# Patient Record
Sex: FEMALE | Race: OTHER | NOT HISPANIC OR LATINO | URBAN - METROPOLITAN AREA
[De-identification: names, ages, dates, MRNs, and addresses within clinical notes are randomized per-mention and may not be internally consistent; named-entity substitution may affect disease eponyms.]

---

## 2021-08-03 ENCOUNTER — OUTPATIENT (OUTPATIENT)
Dept: OUTPATIENT SERVICES | Facility: HOSPITAL | Age: 36
LOS: 1 days | Discharge: HOME | End: 2021-08-03
Payer: COMMERCIAL

## 2021-08-03 PROCEDURE — 90792 PSYCH DIAG EVAL W/MED SRVCS: CPT | Mod: 95

## 2021-08-11 PROCEDURE — 90834 PSYTX W PT 45 MINUTES: CPT | Mod: 95

## 2021-08-19 DIAGNOSIS — F42.9 OBSESSIVE-COMPULSIVE DISORDER, UNSPECIFIED: ICD-10-CM

## 2021-08-24 PROCEDURE — 99214 OFFICE O/P EST MOD 30 MIN: CPT | Mod: 95

## 2022-01-04 PROCEDURE — 90853 GROUP PSYCHOTHERAPY: CPT | Mod: 95

## 2022-01-12 PROCEDURE — 90834 PSYTX W PT 45 MINUTES: CPT | Mod: 95

## 2022-03-08 PROCEDURE — 90853 GROUP PSYCHOTHERAPY: CPT | Mod: 95

## 2022-03-10 PROCEDURE — 90853 GROUP PSYCHOTHERAPY: CPT | Mod: 95

## 2022-07-13 PROCEDURE — 90853 GROUP PSYCHOTHERAPY: CPT | Mod: 95

## 2022-07-20 PROCEDURE — 90853 GROUP PSYCHOTHERAPY: CPT | Mod: 95

## 2022-07-27 PROCEDURE — 90853 GROUP PSYCHOTHERAPY: CPT | Mod: 95

## 2022-08-03 PROCEDURE — 90853 GROUP PSYCHOTHERAPY: CPT | Mod: 95

## 2022-08-10 PROCEDURE — 90853 GROUP PSYCHOTHERAPY: CPT | Mod: 95

## 2022-08-17 PROCEDURE — 90853 GROUP PSYCHOTHERAPY: CPT | Mod: 95

## 2022-08-24 PROCEDURE — 90853 GROUP PSYCHOTHERAPY: CPT | Mod: 95

## 2022-08-31 PROCEDURE — 90853 GROUP PSYCHOTHERAPY: CPT | Mod: 95

## 2022-09-16 ENCOUNTER — OUTPATIENT (OUTPATIENT)
Dept: OUTPATIENT SERVICES | Facility: HOSPITAL | Age: 37
LOS: 1 days | Discharge: HOME | End: 2022-09-16

## 2022-10-12 DIAGNOSIS — F42.9 OBSESSIVE-COMPULSIVE DISORDER, UNSPECIFIED: ICD-10-CM

## 2022-10-26 ENCOUNTER — EMERGENCY (EMERGENCY)
Facility: HOSPITAL | Age: 37
LOS: 1 days | Discharge: HOME | End: 2022-10-26
Attending: EMERGENCY MEDICINE | Admitting: EMERGENCY MEDICINE

## 2022-10-26 VITALS
HEART RATE: 90 BPM | TEMPERATURE: 98 F | DIASTOLIC BLOOD PRESSURE: 67 MMHG | OXYGEN SATURATION: 100 % | RESPIRATION RATE: 24 BRPM | SYSTOLIC BLOOD PRESSURE: 118 MMHG

## 2022-10-26 DIAGNOSIS — F42.9 OBSESSIVE-COMPULSIVE DISORDER, UNSPECIFIED: ICD-10-CM

## 2022-10-26 PROCEDURE — 90792 PSYCH DIAG EVAL W/MED SRVCS: CPT | Mod: GC

## 2022-10-26 PROCEDURE — 99284 EMERGENCY DEPT VISIT MOD MDM: CPT

## 2022-10-26 RX ADMIN — Medication 2 MILLIGRAM(S): at 09:21

## 2022-10-26 NOTE — ED BEHAVIORAL HEALTH ASSESSMENT NOTE - SUMMARY
Ms. Hawthorne is a 38yo F, domiciled with  and three children, unemployed, PPH of OCD and anxiety with history of previous IP admission a few years ago at [facility] for intrusive thoughts of wanting to hurt her children, no history of harming others, one previous suicide attempt via OD, following up at [facility], no significant PMH, presenting to ED for intrusive fears that she is going to harm her child. Pt presenting with an exacerbation of her OCD symptoms, triggered by hearing about another person with OCD hurting their children. Her presentation is similar to her presentation as an outpatient, with intrusive fears of hurting her children although never having done so in the past. Currently pt has no thoughts of harming her children with no history of doing so in the past, with outpatient psychiatrist in agreement, not posing as an imminent threat to children and not requiring inpatient psychiatric admission.     Plan:  -discharge home   -spoke with outpatient pschiatrist about starting benzo with our recommendation as Klonopin 0.5mg BID PRN and Atarax for breakthrough anxiety during exacerbation. Ms. Hawthorne is a 36yo F, domiciled with  and three children, unemployed, PPH of OCD and anxiety with history of previous IP admission a few years ago at [facility] for intrusive thoughts of wanting to hurt her children, no history of harming others, one previous suicide attempt via OD, following up at [facility], no significant PMH, presenting to ED for intrusive fears that she is going to harm her child.   Pt presenting with an exacerbation of her OCD symptoms, triggered by hearing about another person with OCD hurting their children. Her presentation is similar to her presentation as an outpatient, with intrusive fears of hurting her children although never having done so in the past. Currently pt has no thoughts of harming her children with no history of doing so in the past, with outpatient psychiatrist in agreement, not posing as an imminent threat to children and not requiring inpatient psychiatric admission.     Plan:  -discharge home   -spoke with outpatient psychiatrist about starting benzo with our recommendation as Klonopin 0.5mg BID PRN and Atarax for breakthrough anxiety during exacerbation.

## 2022-10-26 NOTE — ED BEHAVIORAL HEALTH ASSESSMENT NOTE - DESCRIPTION
tearful and anxious, received Ativan 2mg for anxiety. 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 Overall in good behavioral control    Vital Stable

## 2022-10-26 NOTE — ED BEHAVIORAL HEALTH NOTE - BEHAVIORAL HEALTH NOTE
COLLATERAL INFO OBTAINED FROM THE SPOUSE, PETRA MARIE [phone]: who reported that the pt has hx of OCD and had prior psych admissions; with past hx of OD on pills.  currently, on follow up at [facility]. has been compliant with her prescribed meds. lately for the past 1 week, the Pt was noted to be withdrawn - not divulging on details of what is going on with her; she is not motivated to go out.     reported that there are triggering events on the Pt: in particular, a relationship conflict with the mother; over the weekend, "something happened" between Pt and mother as Pt visited the mother (in [place]). Pt would not divulge details re: what transpired between her and the mother. another potential trigger is an underlying evolving marital discord.  resents that the Pt is not forthcoming re; how she is feeling, nor what has been happening between Pt and her mother.  reported that Pt's mother "does not like him".      denied any signs/ symptoms of psychosis; not manic. early this AM, the pt woke him up. she told him that she will be going to the ED as she has been increasingly anxious; having bothersome thoughts about hurting their son.  does not think that Pt will resort to hurting their son. however, did express that he feels uncomfortable leaving their son with the Pt whilst he goes to work (as an ).   denied Pt actively verbalizing any HI. no HI towards family members or any other people in the community. no passive or active SI.

## 2022-10-26 NOTE — ED PROVIDER NOTE - CLINICAL SUMMARY MEDICAL DECISION MAKING FREE TEXT BOX
38 yo F with history of post partum depression and was hospitalized 14 years ago for this that presents with anxiety and concern for HI towards her 1 year old son. Pt is crying in ED. No abnormal physical exam findings. Will consult Psych for recs for post partum depression with concern for hurting her son.

## 2022-10-26 NOTE — ED PROVIDER NOTE - OBJECTIVE STATEMENT
38 yo F with history of post partum depression and was hospitalized 14 years ago for this that presents with anxiety and concern for HI towards her 1 year old son. Per pt, she is a patient of Dr Caro at [facility] for anxiety, on multiple medications but does not recall which one she was Rx'd for anxiety. Last two weeks has been 38 yo F with history of post partum depression and was hospitalized 14 years ago for this that presents with anxiety and concern for HI towards her 1 year old son. Per pt, she is a patient of Dr Caro at [facility] for anxiety, on multiple medications but does not recall which one she was Rx'd for anxiety. Last two weeks has been having worsening anxiety and tonight was on facebook, saw another mother post something about OCD and hurting their child which set pt off and she took her meds but not helping so came to ED for eval. Denies any physical pain or symptoms. No SI/hallucinations otherwise, No smoking, drinking, drugs.

## 2022-10-26 NOTE — ED PROVIDER NOTE - NSFOLLOWUPINSTRUCTIONS_ED_ALL_ED_FT
[facility] outpatient psychiatry to arrange a follow up appointment.    Worsening symptoms please return back to the emergency department.    Continue your prescribed medications.

## 2022-10-26 NOTE — ED BEHAVIORAL HEALTH ASSESSMENT NOTE - RISK ASSESSMENT
Risk factors: h/o psych admissions, one previous SA, intrusive thoughts of harming child    Protective factors: no current SIIP/HIIP, no access to weapons, no active substance abuse, good physical health, no psychosis, dependent children, domiciled, intact marriage, social supports, positive therapeutic relationship, engaged in treatment, compliant with treatment, help-seeking behaviors    Overall, pt is a low risk of harm to self/others and does not meet criteria for psychiatric admission. Low Acute Suicide Risk

## 2022-10-26 NOTE — ED PROVIDER NOTE - PROGRESS NOTE DETAILS
LILIAM: Psych are in process of evaluating pt. They recommend Ativan 2 mg at this time PO. Will order and continue to monitor pt pending final psych recs. Abdias: D/w janelle. They will reassess and make their recommendations. As per janelle patient has had similar ruminations in the past several years with no plans and stated that the patient will be safe to d/c however they are reevaluating and will make their final recommendation., Abdias: Psych to d/c patient. OP [facility] clinic to follow up with appointment including a prescription for a benzo.

## 2022-10-26 NOTE — ED BEHAVIORAL HEALTH ASSESSMENT NOTE - NSBHATTESTCOMMENTATTENDFT_PSY_A_CORE
Ms. Hawthorne is a 38yo F, domiciled with  and three children, unemployed, PPH of OCD and anxiety with history of previous IP admission a few years ago at [facility] for intrusive thoughts of wanting to hurt her children, no history of harming others, one previous suicide attempt via OD, following up at [facility], no significant PMH, presenting to ED for intrusive fears that she is going to harm her child.  Patient self presented with worsening intrusive thoughts of fears she's going to harm her children as well as fear of developing psychosis. Patient in her lifetime, has never harmed her child or exhibited symptoms of psychosis. She has been calm and cooperative in the ED and after a dose of Ativan 2mg po, she reports her anxiety to be improved. Patient is able to effectively safety plan,  has no acute safety concerns and outpatient psychiatrist reports patient to be at her baseline. Patient at this time does not meet criteria for inpatient psychiatric hospitalization and would continue to benefit from outpatient therapy to keep working on skills.

## 2022-10-26 NOTE — ED BEHAVIORAL HEALTH ASSESSMENT NOTE - OTHER PAST PSYCHIATRIC HISTORY (INCLUDE DETAILS REGARDING ONSET, COURSE OF ILLNESS, INPATIENT/OUTPATIENT TREATMENT)
as per HPI Previous hospitalization at [facility]   one SA by MONALISA  currently in treatment at [facility] with Dr. Caro - [phone]

## 2022-10-26 NOTE — ED ADULT NURSE NOTE - OBJECTIVE STATEMENT
Pt coming from home via for worsening anxiety and intrusive thoughts to harm her 2 yo baby. Pt arrives awake, ambulatory a&ox3, calm and cooperative; reports PPH of Post Partum depression with psychiatric hospitalization 14yrs ago after her second child. Currently in outpt tx with med management by psychiatrist. Pt reports anxiety has been worsening for 2 weeks with poor sleep pattern, receiving max 4hrs. Pt denies S/I H/I I/P; denies AVH, paranoia or IOR; denies manic symptoms; denies harming her baby who she reports is safe in the care of patient's . Pt denies illicit drug/etoh use; reports compliance with Zoloft and other unk name of med for anxiety; reports she was recently started on another medication by her psychiatrist but has not picked up script from pharmacy. Patient has no physical complaints and denies she is currently breast feeding.

## 2022-10-26 NOTE — ED ADULT TRIAGE NOTE - CHIEF COMPLAINT QUOTE
pt c/o worsening anxiety and an increase of intrusive thoughts. denies SI, HI, medical complaints, PMHx. took 1x PRN anxiety medication at 5am.

## 2022-10-26 NOTE — ED BEHAVIORAL HEALTH ASSESSMENT NOTE - CURRENT MEDICATION
Zoloft 300mg, Atarax 25-50mg PRN up to 3 times per day, prescribed Abilify 2mg but has not started taking yet

## 2022-10-26 NOTE — ED BEHAVIORAL HEALTH ASSESSMENT NOTE - HPI (INCLUDE ILLNESS QUALITY, SEVERITY, DURATION, TIMING, CONTEXT, MODIFYING FACTORS, ASSOCIATED SIGNS AND SYMPTOMS)
Ms. Hawthorne is a 36yo F, domiciled with  and three children, unemployed, PPH of OCD and anxiety with history of previous IP admission a few years ago at [facility] for intrusive thoughts of wanting to hurt her children, no history of harming others, one previous suicide attempt via OD, following up at [facility], no significant PMH, presenting to ED for intrusive fears that she is going to harm her child.     Pt has been feeling more anxious recently than at baseline over the last few weeks with increased thoughts and fears that she is going to harm her child and worrying that she is going to be psychotic, with compulsions to ask for reassurance and avoid being around her child during these moments. Pt says that last night she woke up in the middle of the night and used her phone, went on a group message with other individuals struggling with OCD and read a message that another mother with OCD hurt her child. After reading this, pt had a panic attack, was breathing rapidly, vomited in the bathroom, and was extremely tearful, worrying that she was going to harm her child. Said that she did not have any specific intrusive thoughts of harming her child but had intrusive fears that she would harm her child if she were to be with him. Also had increasing worries that she was going to have psychosis, but denies AVH and paranoia stating that she has never experienced these symptoms previously. She denies ever harming her children previously. Her mood is anxious, having problems with sleep. Denies any anhedonia, changes in her appetite, or passive or active SI with intent or plan. Denies HI with intent or plan. Has all her knives locked away with  having the key. No access to firearms. Denies all substance use. Denies previous or current manic symptoms. Has been adherent with her medications but was unable to  and start Abilify which she was recently prescribed yet.     Spoke to outpatient psychiatrist, Dr. Caro. States that pt has had many previous exacerbations of intrusive thoughts of wanting to harm child, also stating that part of her obsessions are thinking that she is going to have psychosis. Pt was seen in the last week and is in the middle of an exacerbation of symptoms which is why she prescribed Abilify for augmentation of her Lexapro. She has done many previous safety assessments with patient and does not think that she poses any threat to her children or self and does not think she needs inpatient admission. She feels comfortable with her being discharged home and will provide additional PRN medications on discharge and follow up as an outpatient.

## 2022-10-26 NOTE — ED PROVIDER NOTE - PATIENT PORTAL LINK FT
You can access the FollowMyHealth Patient Portal offered by [facility] by registering at the following website: [email or web address]. By joining [employer]’s FollowMyHealth portal, you will also be able to view your health information using other applications (apps) compatible with our system.

## 2022-10-27 NOTE — ED BEHAVIORAL HEALTH NOTE - BEHAVIORAL HEALTH NOTE
High Risk Discharges. Writer called pt  spoke to patient who states she's fine and has a follow up appointment on tues 11/1/22.

## 2024-03-21 PROBLEM — F42.9 OBSESSIVE-COMPULSIVE DISORDER, UNSPECIFIED: Chronic | Status: ACTIVE | Noted: 2022-10-26

## 2024-07-03 PROCEDURE — 90853 GROUP PSYCHOTHERAPY: CPT | Mod: 93

## 2024-08-06 PROCEDURE — 90853 GROUP PSYCHOTHERAPY: CPT | Mod: 93

## 2024-11-05 PROCEDURE — 90853 GROUP PSYCHOTHERAPY: CPT | Mod: 93

## 2024-12-04 PROCEDURE — 90834 PSYTX W PT 45 MINUTES: CPT | Mod: 93
